# Patient Record
Sex: MALE | URBAN - METROPOLITAN AREA
[De-identification: names, ages, dates, MRNs, and addresses within clinical notes are randomized per-mention and may not be internally consistent; named-entity substitution may affect disease eponyms.]

---

## 2021-05-05 ENCOUNTER — APPOINTMENT (OUTPATIENT)
Age: 71
Setting detail: DERMATOLOGY
End: 2021-05-06

## 2021-05-05 DIAGNOSIS — H02.10 UNSPECIFIED ECTROPION OF EYELID: ICD-10-CM

## 2021-05-05 DIAGNOSIS — H02.40 UNSPECIFIED PTOSIS OF EYELID: ICD-10-CM

## 2021-05-05 DIAGNOSIS — H02.83 DERMATOCHALASIS OF EYELID: ICD-10-CM

## 2021-05-05 DIAGNOSIS — D485 NEOPLASM OF UNCERTAIN BEHAVIOR OF SKIN: ICD-10-CM

## 2021-05-05 PROBLEM — D48.5 NEOPLASM OF UNCERTAIN BEHAVIOR OF SKIN: Status: ACTIVE | Noted: 2021-05-05

## 2021-05-05 PROBLEM — H02.831 DERMATOCHALASIS OF RIGHT UPPER EYELID: Status: ACTIVE | Noted: 2021-05-05

## 2021-05-05 PROBLEM — H02.105 UNSPECIFIED ECTROPION OF LEFT LOWER EYELID: Status: ACTIVE | Noted: 2021-05-05

## 2021-05-05 PROBLEM — H02.102 UNSPECIFIED ECTROPION OF RIGHT LOWER EYELID: Status: ACTIVE | Noted: 2021-05-05

## 2021-05-05 PROBLEM — H02.834 DERMATOCHALASIS OF LEFT UPPER EYELID: Status: ACTIVE | Noted: 2021-05-05

## 2021-05-05 PROBLEM — H02.403 UNSPECIFIED PTOSIS OF BILATERAL EYELIDS: Status: ACTIVE | Noted: 2021-05-05

## 2021-05-05 PROCEDURE — 92285 EXTERNAL OCULAR PHOTOGRAPHY: CPT

## 2021-05-05 PROCEDURE — OTHER MEDICAL CONSULTATION: BLEPHAROPLASTY: OTHER

## 2021-05-05 PROCEDURE — OTHER OBSERVATION: OTHER

## 2021-05-05 PROCEDURE — 99204 OFFICE O/P NEW MOD 45 MIN: CPT

## 2021-05-05 PROCEDURE — OTHER OTHER: OTHER

## 2021-05-05 PROCEDURE — OTHER COUNSELING: OTHER

## 2021-05-05 ASSESSMENT — LOCATION DETAILED DESCRIPTION DERM
LOCATION DETAILED: LEFT SUPERIOR MEDIAL MALAR CHEEK
LOCATION DETAILED: RIGHT INFERIOR LID MARGIN
LOCATION DETAILED: RIGHT LATERAL SUPERIOR EYELID
LOCATION DETAILED: LEFT INFERIOR LID MARGIN
LOCATION DETAILED: LEFT LATERAL SUPERIOR EYELID

## 2021-05-05 ASSESSMENT — LOCATION ZONE DERM
LOCATION ZONE: EYELID
LOCATION ZONE: FACE

## 2021-05-05 ASSESSMENT — LOCATION SIMPLE DESCRIPTION DERM
LOCATION SIMPLE: RIGHT INFERIOR EYELID
LOCATION SIMPLE: RIGHT SUPERIOR EYELID
LOCATION SIMPLE: LEFT SUPERIOR EYELID
LOCATION SIMPLE: LEFT CHEEK
LOCATION SIMPLE: LEFT INFERIOR EYELID

## 2021-05-05 NOTE — HPI: OTHER
Condition:: Sagging upper eyelid skin
Please Describe Your Condition:: Upper eyelid skin has become bothersome over laser 5 years and is becoming more prominent. Creating issues when driving and reading as patient is an . Referral from Dr. Sanchez and here for a consult. Previous lower bleph in 1989.

## 2021-05-05 NOTE — PROCEDURE: COUNSELING
Patient Specific Counseling (Will Not Stick From Patient To Patient): Small firm cystic lesion underneath skin along left lower lid / cheek junction. \\n-Has been cryo’d previously by his dermatologist\\n-irritates patient \\n-Plan for removal at time of eyelid surgery [address]
Detail Level: Simple
Detail Level: Detailed

## 2021-05-05 NOTE — PROCEDURE: OTHER
Note Text (......Xxx Chief Complaint.): This diagnosis correlates with the
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 (additional 50 mins = 110 mins total) \\n-Photos today.  \\n
Detail Level: Zone
Render Risk Assessment In Note?: no

## 2021-05-07 ENCOUNTER — RX ONLY (RX ONLY)
Age: 71
End: 2021-05-07

## 2021-05-07 RX ORDER — ERYTHROMYCIN 5 MG/G
OINTMENT OPHTHALMIC BID
Qty: 1 | Refills: 1 | Status: ERX | COMMUNITY
Start: 2021-05-07

## 2021-05-11 ENCOUNTER — RX ONLY (RX ONLY)
Age: 71
End: 2021-05-11

## 2021-05-11 RX ORDER — NEOMYCIN SULFATE, POLYMYXIN B SULFATE AND DEXAMETHASONE 3.5; 10000; 1 MG/G; [USP'U]/G; MG/G
OINTMENT OPHTHALMIC
Qty: 1 | Refills: 0 | Status: ERX | COMMUNITY
Start: 2021-05-11

## 2021-06-02 ENCOUNTER — RX ONLY (RX ONLY)
Age: 71
End: 2021-06-02

## 2021-06-02 RX ORDER — METHYLPREDNISOLONE 4 MG/1
TABLET ORAL
Qty: 1 | Refills: 0 | Status: ERX | COMMUNITY
Start: 2021-06-02

## 2021-07-09 ENCOUNTER — APPOINTMENT (OUTPATIENT)
Age: 71
Setting detail: DERMATOLOGY
End: 2021-07-11

## 2021-07-09 ENCOUNTER — RX ONLY (RX ONLY)
Age: 71
End: 2021-07-09

## 2021-07-09 DIAGNOSIS — H02.10 UNSPECIFIED ECTROPION OF EYELID: ICD-10-CM

## 2021-07-09 DIAGNOSIS — H02.40 UNSPECIFIED PTOSIS OF EYELID: ICD-10-CM

## 2021-07-09 DIAGNOSIS — D485 NEOPLASM OF UNCERTAIN BEHAVIOR OF SKIN: ICD-10-CM

## 2021-07-09 DIAGNOSIS — H02.83 DERMATOCHALASIS OF EYELID: ICD-10-CM

## 2021-07-09 PROBLEM — H02.831 DERMATOCHALASIS OF RIGHT UPPER EYELID: Status: ACTIVE | Noted: 2021-07-09

## 2021-07-09 PROBLEM — H02.403 UNSPECIFIED PTOSIS OF BILATERAL EYELIDS: Status: ACTIVE | Noted: 2021-07-09

## 2021-07-09 PROBLEM — H02.102 UNSPECIFIED ECTROPION OF RIGHT LOWER EYELID: Status: ACTIVE | Noted: 2021-07-09

## 2021-07-09 PROBLEM — H02.105 UNSPECIFIED ECTROPION OF LEFT LOWER EYELID: Status: ACTIVE | Noted: 2021-07-09

## 2021-07-09 PROBLEM — D48.5 NEOPLASM OF UNCERTAIN BEHAVIOR OF SKIN: Status: ACTIVE | Noted: 2021-07-09

## 2021-07-09 PROBLEM — H02.834 DERMATOCHALASIS OF LEFT UPPER EYELID: Status: ACTIVE | Noted: 2021-07-09

## 2021-07-09 PROCEDURE — OTHER MIPS QUALITY: OTHER

## 2021-07-09 PROCEDURE — OTHER COUNSELING: OTHER

## 2021-07-09 PROCEDURE — 67904 REPAIR EYELID DEFECT: CPT | Mod: 50

## 2021-07-09 PROCEDURE — OTHER OBSERVATION: OTHER

## 2021-07-09 PROCEDURE — OTHER OTHER: OTHER

## 2021-07-09 PROCEDURE — OTHER RETURN TO REFERRING PROVIDER: OTHER

## 2021-07-09 PROCEDURE — 67840 REMOVE EYELID LESION: CPT

## 2021-07-09 PROCEDURE — 67917 REPAIR EYELID DEFECT: CPT | Mod: 50

## 2021-07-09 PROCEDURE — 15823 BLEPHARP UPR EYELID XCSV SKN: CPT | Mod: 50

## 2021-07-09 RX ORDER — NEOMYCIN SULFATE, POLYMYXIN B SULFATE AND DEXAMETHASONE 3.5; 10000; 1 MG/ML; [USP'U]/ML; MG/ML
SUSPENSION OPHTHALMIC
Qty: 1 | Refills: 0 | Status: ERX | COMMUNITY
Start: 2021-07-09

## 2021-07-09 ASSESSMENT — LOCATION ZONE DERM
LOCATION ZONE: EYELID
LOCATION ZONE: FACE

## 2021-07-09 ASSESSMENT — LOCATION SIMPLE DESCRIPTION DERM
LOCATION SIMPLE: LEFT CHEEK
LOCATION SIMPLE: LEFT SUPERIOR EYELID
LOCATION SIMPLE: RIGHT SUPERIOR EYELID
LOCATION SIMPLE: LEFT INFERIOR EYELID
LOCATION SIMPLE: RIGHT INFERIOR EYELID

## 2021-07-09 ASSESSMENT — LOCATION DETAILED DESCRIPTION DERM
LOCATION DETAILED: LEFT SUPERIOR MEDIAL MALAR CHEEK
LOCATION DETAILED: RIGHT LATERAL SUPERIOR EYELID
LOCATION DETAILED: RIGHT INFERIOR LID MARGIN
LOCATION DETAILED: LEFT INFERIOR LID MARGIN
LOCATION DETAILED: LEFT LATERAL SUPERIOR EYELID

## 2021-07-09 NOTE — PROCEDURE: OTHER
Other (Free Text): See above op note
Detail Level: Zone
Render Risk Assessment In Note?: no
Note Text (......Xxx Chief Complaint.): This diagnosis correlates with the
Other (Free Text): Ref #[id number]

## 2021-07-09 NOTE — HPI: OTHER
Condition:: ectropion BLL
Please Describe Your Condition:: Unchanged since evaluation. Patient is here for ectropion repair surgery BLL.

## 2021-07-09 NOTE — PROCEDURE: COUNSELING
Detail Level: Detailed
Patient Specific Counseling (Will Not Stick From Patient To Patient): See above op note

## 2021-08-04 ENCOUNTER — APPOINTMENT (OUTPATIENT)
Age: 71
Setting detail: DERMATOLOGY
End: 2021-08-12

## 2021-08-04 ENCOUNTER — APPOINTMENT (OUTPATIENT)
Age: 71
Setting detail: DERMATOLOGY
End: 2021-08-04

## 2021-08-04 VITALS — TEMPERATURE: 97.8 F

## 2021-08-04 DIAGNOSIS — D485 NEOPLASM OF UNCERTAIN BEHAVIOR OF SKIN: ICD-10-CM

## 2021-08-04 DIAGNOSIS — Z41.9 ENCOUNTER FOR PROCEDURE FOR PURPOSES OTHER THAN REMEDYING HEALTH STATE, UNSPECIFIED: ICD-10-CM

## 2021-08-04 DIAGNOSIS — Z48.817 ENCOUNTER FOR SURGICAL AFTERCARE FOLLOWING SURGERY ON THE SKIN AND SUBCUTANEOUS TISSUE: ICD-10-CM

## 2021-08-04 DIAGNOSIS — L73.8 OTHER SPECIFIED FOLLICULAR DISORDERS: ICD-10-CM

## 2021-08-04 PROBLEM — D48.5 NEOPLASM OF UNCERTAIN BEHAVIOR OF SKIN: Status: ACTIVE | Noted: 2021-08-04

## 2021-08-04 PROCEDURE — OTHER BIOPSY BY SHAVE METHOD: OTHER

## 2021-08-04 PROCEDURE — OTHER OTHER: OTHER

## 2021-08-04 PROCEDURE — OTHER COSMETIC CONSULTATION: LASER RESURFACING: OTHER

## 2021-08-04 PROCEDURE — OTHER EYELID EXCISION: OTHER

## 2021-08-04 PROCEDURE — OTHER INTRALESIONAL KENALOG: OTHER

## 2021-08-04 PROCEDURE — 67840 REMOVE EYELID LESION: CPT

## 2021-08-04 PROCEDURE — 92285 EXTERNAL OCULAR PHOTOGRAPHY: CPT

## 2021-08-04 PROCEDURE — OTHER COUNSELING: OTHER

## 2021-08-04 PROCEDURE — 99024 POSTOP FOLLOW-UP VISIT: CPT

## 2021-08-04 PROCEDURE — OTHER MIPS QUALITY: OTHER

## 2021-08-04 PROCEDURE — 11900 INJECT SKIN LESIONS </W 7: CPT

## 2021-08-04 ASSESSMENT — LOCATION SIMPLE DESCRIPTION DERM
LOCATION SIMPLE: LEFT INFERIOR EYELID
LOCATION SIMPLE: LEFT CHEEK
LOCATION SIMPLE: LEFT EYELID
LOCATION SIMPLE: RIGHT CHEEK
LOCATION SIMPLE: RIGHT SUPERIOR EYELID
LOCATION SIMPLE: LEFT CHEEK
LOCATION SIMPLE: LEFT SUPERIOR EYELID

## 2021-08-04 ASSESSMENT — LOCATION DETAILED DESCRIPTION DERM
LOCATION DETAILED: LEFT SUPERIOR MEDIAL MALAR CHEEK
LOCATION DETAILED: RIGHT LATERAL SUPERIOR EYELID
LOCATION DETAILED: LEFT SUPERIOR MEDIAL MALAR CHEEK
LOCATION DETAILED: LEFT SUPERIOR CENTRAL MALAR CHEEK
LOCATION DETAILED: LEFT LATERAL CANTHUS
LOCATION DETAILED: LEFT LATERAL SUPERIOR EYELID
LOCATION DETAILED: RIGHT SUPERIOR CENTRAL MALAR CHEEK
LOCATION DETAILED: LEFT MEDIAL INFERIOR PRESEPTAL REGION

## 2021-08-04 ASSESSMENT — LOCATION ZONE DERM
LOCATION ZONE: EYELID
LOCATION ZONE: FACE
LOCATION ZONE: FACE

## 2021-08-04 NOTE — PROCEDURE: EYELID EXCISION
Billing Type: Third-Party Bill
Consent: The risks, benefits and alternatives of the procedure were discussed with the patient. The patient read and signed the consent form and verbalized full understanding. The patient was identified and timeout confirmed the correct eye for the procedure.  The patient was positioned appropriately.
Procedure Details: Eyelid Excision with Second Intention
Post-Care Instructions: The patient tolerated the procedure well. The patient left the operating suite in good condition. Postoperative medication instructions were given.  The patient was told to call immediately with any severe pain, swelling, increase redness, change in vision, decrease vision or other concerns.
Procedure - Second Intention: After prepping the skin with 10% povidone iodine solution, subcutaneous 2% lidocaine with epinephrine was injected surrounding the lesion.  The lesion was excised with Jaz scissors with cautery used for hemostasis, and submitted in formalin for pathologic examination.  It was elected not perform a primary closure.
Detail Level: Detailed
Procedure - Simple Repair: After prepping the skin with 10% povidone iodine solution, subcutaneous 2% lidocaine with epinephrine was injected surrounding the lesion.  The lesion was excised with Jaz scissors with cautery used for hemostasis, and submitted in formalin for pathologic examination.  It was elected to repair the defect with a primary closure. Simple interrupted 6-0 nylon sutures were used to reapproximate the wound margins.
Cpt Modifiers?: E1-E4

## 2021-08-04 NOTE — PROCEDURE: COUNSELING
Patient Specific Counseling (Will Not Stick From Patient To Patient): -----------\\n3.5 months s/p b/l VEGA and BULB and b/l LL LTS and LLL lesion excision with biopsy 7/9/21\\n-doing great, patient very happy with results thus far. \\n-Reports eyelids look and feel better, and eyes are less irritated now. \\n-Wounds healing appropriately, trace minimal edema and erythema resolving. \\n-Mild edema / elevation internal scar left lateral canthus. Tr conj injection OU\\n-LLL lesion biopsy revealed sebaceous hyperplasia. \\n-Patient seen by his derm MD Dr. Suazo who was concerned about another LLL lesion whitish centered just lateral to biopsy site. \\n--Photos today. \\n--Consents for kenalog and biopsy reviewed and signed with patient. \\n--Alcohol used to clean injection and biopsy sites. Injected 0.1 cc 40 mg/ml kenalog into left lateral canthus\\n--Betadine applied to LLL lesion biopsy site. Excised LLL lesion and placed in specimen cup for histopathology analysis. Handheld cautery applied to base of lesion to prevent recurrence and for hemostasis. No sutures required for closure, will allow to heal with application of antibiotic ointment. Will inform patient re: biopsy results. \\n--Patient interested in CO2 full face laser resurfacing - quote to be provided ($3500 for full face, $1750 for eyes)\\nRTC PRN\\n\\n
Detail Level: Detailed

## 2021-08-04 NOTE — PROCEDURE: INTRALESIONAL KENALOG
Consent: The risks of atrophy were reviewed with the patient.
Validate Note Data When Using Inventory: Yes
Include Z78.9 (Other Specified Conditions Influencing Health Status) As An Associated Diagnosis?: No
Expiration Date For Kenalog (Optional): 5/2022
Medical Necessity Clause: This procedure was medically necessary because the lesions that were treated were:
Lot # For Kenalog (Optional): [id number]
X Size Of Lesion In Cm (Optional): 0
Concentration Of Kenalog Solution Injected (Mg/Ml): 40.0
Kenalog Preparation: Kenalog
Total Volume (Ccs): .1
Detail Level: Detailed
Administered By (Optional): Arelis

## 2021-08-04 NOTE — PROCEDURE: EYELID EXCISION
Post-Care Instructions: The patient tolerated the procedure well. The patient left the operating suite in good condition. Postoperative medication instructions were given.  The patient was told to call immediately with any severe pain, swelling, increase redness, change in vision, decrease vision or other concerns.
Procedure - Second Intention: After prepping the skin with 10% povidone iodine solution, subcutaneous 2% lidocaine with epinephrine was injected surrounding the lesion.  The lesion was excised with Jaz scissors with cautery used for hemostasis, and submitted in formalin for pathologic examination.  It was elected not perform a primary closure.
Cpt Modifiers?: E1-E4
Procedure Details: Eyelid Excision with Second Intention
Procedure - Simple Repair: After prepping the skin with 10% povidone iodine solution, subcutaneous 2% lidocaine with epinephrine was injected surrounding the lesion.  The lesion was excised with Jaz scissors with cautery used for hemostasis, and submitted in formalin for pathologic examination.  It was elected to repair the defect with a primary closure. Simple interrupted 6-0 nylon sutures were used to reapproximate the wound margins.
Billing Type: Third-Party Bill
Detail Level: Detailed
Consent: The risks, benefits and alternatives of the procedure were discussed with the patient. The patient read and signed the consent form and verbalized full understanding. The patient was identified and timeout confirmed the correct eye for the procedure.  The patient was positioned appropriately.

## 2021-08-04 NOTE — PROCEDURE: OTHER
Note Text (......Xxx Chief Complaint.): This diagnosis correlates with the
Other (Free Text): Path verified in chart
Render Risk Assessment In Note?: no
Detail Level: Zone

## 2021-08-04 NOTE — PROCEDURE: BIOPSY BY SHAVE METHOD
Detail Level: Detailed
Information: Selecting Yes will display possible errors in your note based on the variables you have selected. This validation is only offered as a suggestion for you. PLEASE NOTE THAT THE VALIDATION TEXT WILL BE REMOVED WHEN YOU FINALIZE YOUR NOTE. IF YOU WANT TO FAX A PRELIMINARY NOTE YOU WILL NEED TO TOGGLE THIS TO 'NO' IF YOU DO NOT WANT IT IN YOUR FAXED NOTE.
X Size Of Lesion In Cm: 0
Consent: Written consent was obtained and risks were reviewed including but not limited to scarring, infection, bleeding, scabbing, incomplete removal, nerve damage and allergy to anesthesia.
Silver Nitrate Text: The wound bed was treated with silver nitrate after the biopsy was performed.
Hide Biopsy Depth?: No
Dressing: bandage
Notification Instructions: Patient will be notified of biopsy results. However, patient instructed to call the office if not contacted within 2 weeks.
Anesthesia Volume In Cc: 0.5
Curettage Text: The wound bed was treated with curettage after the biopsy was performed.
Billing Type: Third-Party Bill
Electrodesiccation And Curettage Text: The wound bed was treated with electrodesiccation and curettage after the biopsy was performed.
Post-Care Instructions: I reviewed with the patient in detail post-care instructions. Patient is to keep the biopsy site dry overnight, and then apply bacitracin twice daily until healed. Patient may apply hydrogen peroxide soaks to remove any crusting.
Was A Bandage Applied: Yes
Size Of Lesion In Cm: 0.1
Electrodesiccation Text: The wound bed was treated with electrodesiccation after the biopsy was performed.
Wound Care: Petrolatum
Biopsy Method: Dermablade
Type Of Destruction Used: Curettage
Anesthesia Type: 1% lidocaine with epinephrine
Cryotherapy Text: The wound bed was treated with cryotherapy after the biopsy was performed.
Depth Of Biopsy: dermis
Hemostasis: Drysol
Biopsy Type: H and E